# Patient Record
Sex: FEMALE | Race: WHITE
[De-identification: names, ages, dates, MRNs, and addresses within clinical notes are randomized per-mention and may not be internally consistent; named-entity substitution may affect disease eponyms.]

---

## 2017-08-25 ENCOUNTER — HOSPITAL ENCOUNTER (OUTPATIENT)
Dept: HOSPITAL 47 - ORWHC2ENDO | Age: 52
Discharge: HOME | End: 2017-08-25
Payer: COMMERCIAL

## 2017-08-25 VITALS — HEART RATE: 80 BPM | DIASTOLIC BLOOD PRESSURE: 91 MMHG | SYSTOLIC BLOOD PRESSURE: 134 MMHG

## 2017-08-25 VITALS — TEMPERATURE: 98.6 F | RESPIRATION RATE: 16 BRPM

## 2017-08-25 VITALS — BODY MASS INDEX: 30.9 KG/M2

## 2017-08-25 DIAGNOSIS — I34.1: ICD-10-CM

## 2017-08-25 DIAGNOSIS — Z79.1: ICD-10-CM

## 2017-08-25 DIAGNOSIS — Z80.0: ICD-10-CM

## 2017-08-25 DIAGNOSIS — K57.30: ICD-10-CM

## 2017-08-25 DIAGNOSIS — E07.9: ICD-10-CM

## 2017-08-25 DIAGNOSIS — Z12.11: Primary | ICD-10-CM

## 2017-08-25 DIAGNOSIS — Z79.899: ICD-10-CM

## 2017-08-25 PROCEDURE — 81025 URINE PREGNANCY TEST: CPT

## 2017-08-25 NOTE — P.PCN
Date of Procedure: 08/25/17


Preoperative Diagnosis: 





Postoperative Diagnosis: 





Procedure(s) Performed: 


BRIEF HISTORY: Patient is a 52-year-old pleasant white female, scheduled for an 

elective colonoscopy as a part of screening for colon neoplasia.  Her father 

was diagnosed with colon cancer at age 80.





PROCEDURE PERFORMED: Colonoscopy. 





PREOPERATIVE DIAGNOSIS: Screening for colon cancer and family history of colon 

cancer. 





IV sedation per Anesthesia. 





PROCEDURE: After informed consent was obtained, the patient, was brought into 

the endoscopy unit. IV sedation was administered by Anesthesia under continuous 

monitoring.  Digital rectal examination was normal. Initially the Olympus CF-

160 flexible video colonoscope was then inserted in the rectum, gradually 

advanced into the cecum without any difficulty. Careful examination was 

performed as the scope was gradually being withdrawn. Ileocecal valve and the 

appendiceal orifice were visualized and appeared normal.  Prep was excellent. 

Mucosa of the cecum, ascending colon, transverse colon, descending colon, 

sigmoid colon, and rectum appeared normal.  Scattered left sided diverticulosis 

seen.  Retroflexion was performed in the rectum and no lesions were seen. The 

patient tolerated the procedure well. 





IMPRESSION: 


Normal-appearing colon from rectum to cecum with no evidence of colorectal 

neoplasia .


Scattered sigmoid diverticulosis





RECOMMENDATIONS:  Findings of this examination were discussed with the patient 

as well as a family.  She was advised to have a repeat screening colonoscopy in 

5 years because of the family history of colon cancer..





Implants: 





Indications for Procedure: 





Operative Findings: 





Description of Procedure:

## 2018-10-03 ENCOUNTER — HOSPITAL ENCOUNTER (OUTPATIENT)
Dept: HOSPITAL 47 - RADMAMWWP | Age: 53
Discharge: HOME | End: 2018-10-03
Attending: OBSTETRICS & GYNECOLOGY
Payer: COMMERCIAL

## 2018-10-03 DIAGNOSIS — Z12.31: Primary | ICD-10-CM

## 2018-10-03 PROCEDURE — 77067 SCR MAMMO BI INCL CAD: CPT

## 2018-10-03 PROCEDURE — 77063 BREAST TOMOSYNTHESIS BI: CPT

## 2018-10-04 NOTE — MM
Reason for exam: screening  (asymptomatic).

Last mammogram was performed 2 years and 2 months ago.



Physical Findings:

A clinical breast exam by your physician is recommended on an annual basis and 

results should be correlated with mammographic findings.



MG 3D Screening Mammo W/Cad

Bilateral CC and MLO view(s) were taken.

Prior study comparison: August 2, 2016, bilateral MG screening mammo w CAD.  June 25, 2014, bilateral MG screening mammo w CAD.

There are scattered fibroglandular densities.  No significant changes when 

compared with prior studies.





ASSESSMENT: Negative, BI-RAD 1



RECOMMENDATION:

Routine screening mammogram of both breasts in 1 year.

## 2021-07-20 ENCOUNTER — HOSPITAL ENCOUNTER (OUTPATIENT)
Dept: HOSPITAL 47 - RADMAMWWP | Age: 56
Discharge: HOME | End: 2021-07-20
Attending: FAMILY MEDICINE
Payer: COMMERCIAL

## 2021-07-20 DIAGNOSIS — Z12.31: Primary | ICD-10-CM

## 2021-07-20 DIAGNOSIS — Z79.899: ICD-10-CM

## 2021-07-20 PROCEDURE — 77067 SCR MAMMO BI INCL CAD: CPT

## 2021-07-20 NOTE — MM
Reason for exam: screening  (asymptomatic).

Last mammogram was performed 2 years and 10 months ago.



History:

Took other hormone beginning at age 52.



Physical Findings:

A clinical breast exam by your physician is recommended on an annual basis and 

results should be correlated with mammographic findings.



MG Screening Mammo w CAD

Bilateral CC and MLO view(s) were taken.

Prior study comparison: October 3, 2018, bilateral MG 3d screening mammo w/cad.  

August 2, 2016, bilateral MG screening mammo w CAD.

There are scattered fibroglandular densities.





ASSESSMENT: Negative, BI-RAD 1



RECOMMENDATION:

Routine screening mammogram of both breasts in 1 year.

## 2023-04-27 ENCOUNTER — HOSPITAL ENCOUNTER (OUTPATIENT)
Dept: HOSPITAL 47 - RADBDWWP | Age: 58
Discharge: HOME | End: 2023-04-27
Attending: FAMILY MEDICINE
Payer: COMMERCIAL

## 2023-04-27 DIAGNOSIS — Z78.0: ICD-10-CM

## 2023-04-27 DIAGNOSIS — Z12.31: Primary | ICD-10-CM

## 2023-04-27 PROCEDURE — 77067 SCR MAMMO BI INCL CAD: CPT

## 2023-04-27 PROCEDURE — 77080 DXA BONE DENSITY AXIAL: CPT

## 2023-04-27 NOTE — BD
EXAMINATION TYPE: Axial Bone Density

 

DATE OF EXAM: 2023

 

CLINICAL HISTORY: 57 years old Female.  ICD-10 CODE: Z780 POST JAMES WITHOUT HRT

 

Height:  64.5in

Weight:  203lb

 

FRAX RISK QUESTIONS:

    

Secondary Osteoporosis:

  

RISK FACTORS 

HISTORY OF: 

Active: yes

Postmenopausal woman: yes

Take estrogen and/or progesterone medications: yes, none current

How long: about 1 year

 

MEDICATIONS: 

Thyroid Medications:  

Which medication: Synthroid

How Lon years

Additional Medications: none 

 

 

Additional History: none

 

 

EXAM MEASUREMENTS: 

Bone mineral densitometry was performed using the [employer] System.

Bone mineral density as measured about the Lumbar spine is:  

----- L1-L4(G/cm2): 1.524

T Score Values are as follows:

----- L1: 0.5

----- L2: 1.1

----- L3: 4.6

----- L4: 5.3

----- L1-L4: 2.9

 

Z Score Values are as follows:

----- L1: 0.8

----- L2: 1.4

----- L3: 4.8

----- L4: 5.6

----- L1-L4: 3.1

 

First dexa at [facility]

 

Bone mineral density about the R hip (g/cm2): 1.013

Bone mineral density about the L hip (g/cm2): 1.069

T Score values are as follows:

-----R Neck: -0.4

-----L Neck: 0.0

-----R Total: 0.0

-----L Total: 0.5

 

Z Score values are as follows:

-----R Neck: 0.3

-----L Neck: 0.6

-----R Total: 0.3

-----L Total: 0.8

 

 

FRAX%s: The graph provided illustrates a 6% chance for a major osteoporotic fx and a 0.2% chance for 
the hips probability for fx in 10 years time.

 

 

 

 

IMPRESSION:

Normal (Values between +1 and -1 indicate normal bone mass).  Consider repeating this study in 5 year
s or sooner if there is some new clinical indication.

 

NOTE:  T-SCORE=SD OF THE YOUNG ADULT MEAN.

## 2023-04-28 NOTE — MM
Reason for Exam: Screening  (asymptomatic). 

Last mammogram was performed 1 year(s) and 9 month(s) ago. 





Patient History: 

Menarche at age 11. First Full-Term Pregnancy at age 22. Postmenopausal. 





Risk Values: 

Tuyet 5 year model risk: 1.2%.

NCI Lifetime model risk: 6.7%.





Prior Study Comparison: 

6/25/2014 Bilateral Screening Mammogram, [facility]. 8/2/2016 Bilateral Screening Mammogram, [facility]. 10/3/2018

Bilateral Screening Mammogram, [facility]. 7/20/2021 Bilateral Screening Mammogram, [facility]. 





Tissue Density: 

There are scattered fibroglandular densities.





Findings: 

Analyzed By CAD. 

Benign-appearing left axillary lymph nodes are redemonstrated.



There is no suspicious group of microcalcifications or new suspicious mass in either breast. 





Overall Assessment: Negative, BI-RAD 1





Management: 

Screening Mammogram of both breasts in 1 year.

.



Patient should continue monthly self-breast exams.  A clinical breast exam by your physician is

recommended on an annual basis.

This exam should not preclude additional follow-up of suspicious palpable abnormalities.



Note on Tuyet scores and lifetime risk:

1. A Tuyet score greater than 3% is considered moderate risk. If this is the case, consider

specialist referral to assess eligibility for a risk reducing agent.

2. If overall lifetime risk for the development of breast cancer is 20% or higher, the patient may

qualify for future screening with alternating mammogram and breast MRI.



Electronically signed and approved by: Isaiah Yap M.D.

## 2024-09-03 ENCOUNTER — HOSPITAL ENCOUNTER (OUTPATIENT)
Dept: HOSPITAL 47 - RADMAMWWP | Age: 59
Discharge: HOME | End: 2024-09-03
Attending: FAMILY MEDICINE
Payer: COMMERCIAL

## 2024-09-03 PROCEDURE — 77063 BREAST TOMOSYNTHESIS BI: CPT

## 2024-09-03 PROCEDURE — 77067 SCR MAMMO BI INCL CAD: CPT

## 2024-09-08 NOTE — MM
Reason for Exam: Screening  (asymptomatic). 

Last mammogram was performed 1 year(s) and 5 month(s) ago. 





Patient History: 

Menarche at age 11. First Full-Term Pregnancy at age 22. Postmenopausal. 





Risk Values: 

Tuyet 5 year model risk: 1.3%.

NCI Lifetime model risk: 6.4%.





Prior Study Comparison: 

10/3/2018 Bilateral Screening Mammogram, [facility]. 7/20/2021 Bilateral Screening Mammogram, [facility].

4/27/2023 Bilateral MG screening mammo w CAD, [facility]. 





Tissue Density: 

There are scattered areas of fibroglandular density.





Findings: 

Analyzed By CAD. 

Right breast: There is no suspicious group of microcalcifications or new suspicious mass.



Left breast: There is no suspicious group of microcalcifications or new suspicious mass. 





Overall Assessment: Negative, BI-RAD 1





Management: 

Screening Mammogram of both breasts in 1 year.

[facility] will attempt to contact patient to return for supplemental views and

ultrasound if indicated.



Patient should continue monthly self-breast exams.  A clinical breast exam by your physician is

recommended on an annual basis.

This exam should not preclude additional follow-up of suspicious palpable abnormalities.



Note on Tuyet scores and lifetime risk:

1. A Tuyet score greater than 3% is considered moderate risk. If this is the case, consider

specialist referral to assess eligibility for a risk reducing agent.

2. If overall lifetime risk for the development of breast cancer is 20% or higher, the patient may

qualify for future screening with alternating mammogram and breast MRI.



Electronically signed and approved by: Ky Mahajan DO